# Patient Record
Sex: FEMALE | Race: BLACK OR AFRICAN AMERICAN | Employment: FULL TIME | URBAN - METROPOLITAN AREA
[De-identification: names, ages, dates, MRNs, and addresses within clinical notes are randomized per-mention and may not be internally consistent; named-entity substitution may affect disease eponyms.]

---

## 2022-05-08 ENCOUNTER — APPOINTMENT (OUTPATIENT)
Dept: ULTRASOUND IMAGING | Age: 32
End: 2022-05-08
Attending: EMERGENCY MEDICINE
Payer: MEDICAID

## 2022-05-08 ENCOUNTER — HOSPITAL ENCOUNTER (EMERGENCY)
Age: 32
Discharge: HOME | End: 2022-05-08
Attending: EMERGENCY MEDICINE
Payer: MEDICAID

## 2022-05-08 VITALS
DIASTOLIC BLOOD PRESSURE: 75 MMHG | TEMPERATURE: 97.1 F | HEIGHT: 64 IN | RESPIRATION RATE: 16 BRPM | BODY MASS INDEX: 23.05 KG/M2 | HEART RATE: 98 BPM | OXYGEN SATURATION: 100 % | SYSTOLIC BLOOD PRESSURE: 118 MMHG | WEIGHT: 135 LBS

## 2022-05-08 DIAGNOSIS — R10.9 ABDOMINAL PAIN AFFECTING PREGNANCY: Primary | ICD-10-CM

## 2022-05-08 DIAGNOSIS — O26.899 ABDOMINAL PAIN AFFECTING PREGNANCY: Primary | ICD-10-CM

## 2022-05-08 PROCEDURE — 76815 OB US LIMITED FETUS(S): CPT

## 2022-05-08 PROCEDURE — 99284 EMERGENCY DEPT VISIT MOD MDM: CPT

## 2022-05-08 NOTE — ED TRIAGE NOTES
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

## 2022-05-08 NOTE — LETTER
[address]  [facility] EMERGENCY DEPT  [id number]  [phone]    Work/School Note    Date: 5/8/2022    To Whom It May concern:    Dejah Gray was seen and treated today in the emergency room by the following provider(s):  Attending Provider: Aly Romero MD.      Dejah Gray may return to work on 5/10/22.     Sincerely,          Cj Cesar MD

## 2022-05-08 NOTE — ED NOTES
Discharged by provider. Reports improvement in symptoms. Leaves ambulatory in no acute distress. Denies questions or concerns.

## 2022-05-08 NOTE — ED PROVIDER NOTES
Ms. Krysta Shaver is a 27yo female who presents to the ER with complaints of abdominal discomfort. 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 She said this happened about 530 and 6 AM.  There are 2 different incidents. Each time, she fell to the ground. She said that 1 time she fell on her bottom at the time she fell on her side. She not hit her head. She has some mild lower abdominal cramping and pain. No vaginal discharge or bleeding. She reports some mild pain at her bilateral knees as well. She is not having any difficulty walking. She denies any other complaints. No past medical history on file. No past surgical history on file. No family history on file. Social History     Socioeconomic History    Marital status: Not on file     Spouse name: Not on file    Number of children: Not on file    Years of education: Not on file    Highest education level: Not on file   Occupational History    Not on file   Tobacco Use    Smoking status: Not on file    Smokeless tobacco: Not on file   Substance and Sexual Activity    Alcohol use: Not on file    Drug use: Not on file    Sexual activity: Not on file   Other Topics Concern    Not on file   Social History Narrative    Not on file     Social Determinants of Health     Financial Resource Strain:     Difficulty of Paying Living Expenses: Not on file   Food Insecurity:     Worried About Running Out of Food in the Last Year: Not on file    Emily of Food in the Last Year: Not on file   Transportation Needs:     Lack of Transportation (Medical): Not on file    Lack of Transportation (Non-Medical):  Not on file   Physical Activity:     Days of Exercise per Week: Not on file    Minutes of Exercise per Session: Not on file   Stress:     Feeling of Stress : Not on file   Social Connections:     Frequency of Communication with Friends and Family: Not on file    Frequency of Social Gatherings with Friends and Family: Not on file    Attends Church Services: Not on file    Active Member of Clubs or Organizations: Not on file    Attends Club or Organization Meetings: Not on file    Marital Status: Not on file   Intimate Partner Violence:     Fear of Current or Ex-Partner: Not on file    Emotionally Abused: Not on file    Physically Abused: Not on file    Sexually Abused: Not on file   Housing Stability:     Unable to Pay for Housing in the Last Year: Not on file    Number of [place] in the Last Year: Not on file    Unstable Housing in the Last Year: Not on file         ALLERGIES: Patient has no known allergies. Review of Systems   Constitutional: Negative for chills and fever. HENT: Negative for rhinorrhea and sore throat. Respiratory: Negative for cough and shortness of breath. Cardiovascular: Negative for chest pain. Gastrointestinal: Positive for abdominal pain. Negative for diarrhea, nausea and vomiting. Genitourinary: Negative for dysuria and urgency. Musculoskeletal:        Knee pain   Skin: Negative for rash. Neurological: Negative for dizziness, weakness and light-headedness. Vitals:    05/08/22 0736   BP: 118/75   Pulse: 98   Resp: 16   Temp: 97.1 °F (36.2 °C)   SpO2: 100%   Weight: 61.2 kg (135 lb)   Height: 5' 4\" (1.626 m)            Physical Exam     Vital signs reviewed. Nursing notes reviewed.     Const:  No acute distress, well developed, well nourished  Head:  Atraumatic, normocephalic  Eyes:  PERRL, conjunctiva normal, no scleral icterus  Neck:  Supple, trachea midline  Cardiovascular: Regular rate  Resp:  No resp distress, no increased work of breathing  Abd:  Soft, mild diffuse suprapubic tenderness, non-distended, no rebound, no guarding  MSK:  No pedal edema, normal ROM  Neuro:  Alert and oriented x3, no cranial nerve defect  Skin:  Warm, dry, intact  Psych: normal mood and affect, behavior is normal, judgement and thought content is normal          MDM  Number of Diagnoses or Management Options     Amount and/or Complexity of Data Reviewed  Tests in the radiology section of CPT®: ordered and reviewed  Review and summarize past medical records: yes    Patient Progress  Patient progress: stable          Ms. Winnie Monsalve is a 25yo female who presents to the ER with complaints of abdominal pain/cramping after a fall at work. She states that she feels much better at discharge. NO abnormality on ultrasound. No vaginal bleeding. Pt. Given strict instructions to f/u with her OB or return to the ER with vaginal bleeding, worsening pain, or any other concerning symptom.       Procedures